# Patient Record
Sex: FEMALE | Race: WHITE | NOT HISPANIC OR LATINO | Employment: PART TIME | URBAN - METROPOLITAN AREA
[De-identification: names, ages, dates, MRNs, and addresses within clinical notes are randomized per-mention and may not be internally consistent; named-entity substitution may affect disease eponyms.]

---

## 2017-05-22 ENCOUNTER — ALLSCRIPTS OFFICE VISIT (OUTPATIENT)
Dept: OTHER | Facility: OTHER | Age: 22
End: 2017-05-22

## 2017-08-25 ENCOUNTER — ALLSCRIPTS OFFICE VISIT (OUTPATIENT)
Dept: OTHER | Facility: OTHER | Age: 22
End: 2017-08-25

## 2017-08-25 DIAGNOSIS — N92.6 IRREGULAR MENSTRUATION: ICD-10-CM

## 2017-10-17 ENCOUNTER — GENERIC CONVERSION - ENCOUNTER (OUTPATIENT)
Dept: OTHER | Facility: OTHER | Age: 22
End: 2017-10-17

## 2018-01-13 VITALS
HEIGHT: 64 IN | WEIGHT: 117.38 LBS | DIASTOLIC BLOOD PRESSURE: 74 MMHG | SYSTOLIC BLOOD PRESSURE: 100 MMHG | BODY MASS INDEX: 20.04 KG/M2

## 2018-01-14 VITALS
DIASTOLIC BLOOD PRESSURE: 74 MMHG | SYSTOLIC BLOOD PRESSURE: 118 MMHG | HEIGHT: 64 IN | BODY MASS INDEX: 21.53 KG/M2 | WEIGHT: 126.13 LBS

## 2018-01-22 VITALS
WEIGHT: 117 LBS | HEIGHT: 64 IN | SYSTOLIC BLOOD PRESSURE: 112 MMHG | DIASTOLIC BLOOD PRESSURE: 76 MMHG | BODY MASS INDEX: 19.97 KG/M2

## 2021-08-04 DIAGNOSIS — Z30.430 ENCOUNTER FOR IUD INSERTION: Primary | ICD-10-CM

## 2021-08-04 RX ORDER — MISOPROSTOL 200 UG/1
TABLET ORAL
Qty: 3 TABLET | Refills: 0 | Status: SHIPPED | OUTPATIENT
Start: 2021-08-04

## 2021-08-04 RX ORDER — MISOPROSTOL 200 UG/1
TABLET ORAL
Qty: 3 TABLET | Refills: 0 | Status: CANCELLED | OUTPATIENT
Start: 2021-08-04

## 2021-08-05 ENCOUNTER — TELEPHONE (OUTPATIENT)
Dept: OBGYN CLINIC | Facility: CLINIC | Age: 26
End: 2021-08-05

## 2021-08-05 ENCOUNTER — PROCEDURE VISIT (OUTPATIENT)
Dept: OBGYN CLINIC | Facility: CLINIC | Age: 26
End: 2021-08-05
Payer: COMMERCIAL

## 2021-08-05 VITALS
BODY MASS INDEX: 20.73 KG/M2 | WEIGHT: 121.4 LBS | HEIGHT: 64 IN | DIASTOLIC BLOOD PRESSURE: 68 MMHG | SYSTOLIC BLOOD PRESSURE: 94 MMHG

## 2021-08-05 DIAGNOSIS — Z30.433 ENCOUNTER FOR REMOVAL AND REINSERTION OF INTRAUTERINE CONTRACEPTIVE DEVICE (IUD): Primary | ICD-10-CM

## 2021-08-05 DIAGNOSIS — Z12.4 CERVICAL CANCER SCREENING: ICD-10-CM

## 2021-08-05 PROCEDURE — 58301 REMOVE INTRAUTERINE DEVICE: CPT | Performed by: OBSTETRICS & GYNECOLOGY

## 2021-08-05 PROCEDURE — 58300 INSERT INTRAUTERINE DEVICE: CPT | Performed by: OBSTETRICS & GYNECOLOGY

## 2021-08-05 PROCEDURE — G0145 SCR C/V CYTO,THINLAYER,RESCR: HCPCS | Performed by: OBSTETRICS & GYNECOLOGY

## 2021-08-05 NOTE — PROGRESS NOTES
Iud removal    Date/Time: 8/5/2021 8:22 AM  Performed by: Loly Latham MD  Authorized by: Loly Latham MD   Universal Protocol:  Consent: Verbal consent obtained  Consent given by: patient  Timeout called at: 8/5/2021 8:13 AM   Patient understanding: patient states understanding of the procedure being performed  Patient consent: the patient's understanding of the procedure matches consent given  Procedure consent: procedure consent matches procedure scheduled  Relevant documents: relevant documents present and verified  Test results: test results available and properly labeled  Site marked: the operative site was not marked  Radiology Images displayed and confirmed  If images not available, report reviewed: imaging studies not available  Patient identity confirmed: verbally with patient      Procedure:     Removed with no complications: yes      Removal due to mechanical complications of IUD: no      Removal due to infection and inflammatory reaction: no      Other reason for removal:  Expiration of the  Comments: The IUD was removed difficulty shown to the patient and then discarded  Iud insertions    Date/Time: 8/5/2021 8:24 AM  Performed by: Loly Latham MD  Authorized by: Loly Latham MD   Universal Protocol:  Consent: Verbal consent obtained  Consent given by: patient  Time out: Immediately prior to procedure a "time out" was called to verify the correct patient, procedure, equipment, support staff and site/side marked as required    Timeout called at: 8/5/2021 8:24 AM   Patient understanding: patient states understanding of the procedure being performed  Patient consent: the patient's understanding of the procedure matches consent given  Procedure consent: procedure consent matches procedure scheduled  Relevant documents: relevant documents present and verified  Test results: test results available and properly labeled  Site marked: the operative site was not marked  Radiology Images displayed and confirmed  If images not available, report reviewed: imaging studies not available  Patient identity confirmed: verbally with patient        Procedure:     Pelvic exam performed: yes      Negative GC/chlamydia test: yes      Cervix cleaned and prepped: yes      Speculum placed in vagina: yes      Tenaculum applied to cervix: yes      Uterus sounded: yes      Uterus sound depth (cm):  6    IUD inserted with no complications: yes      IUD type:  Mirena    Strings trimmed: yes    Post-procedure:     Patient tolerated procedure well: yes      Patient will follow up after next period: yes    Comments: The IUD was inserted for difficulty, transabdominal ultrasound showed proper placement without a evidence of perforation  The patient tolerated procedure well return to office in 5 weeks for IUD follow-up  Brochure was given

## 2021-08-11 LAB
LAB AP GYN PRIMARY INTERPRETATION: NORMAL
Lab: NORMAL

## 2021-09-08 ENCOUNTER — OFFICE VISIT (OUTPATIENT)
Dept: OBGYN CLINIC | Facility: CLINIC | Age: 26
End: 2021-09-08
Payer: COMMERCIAL

## 2021-09-08 VITALS
DIASTOLIC BLOOD PRESSURE: 76 MMHG | BODY MASS INDEX: 20.83 KG/M2 | HEIGHT: 64 IN | SYSTOLIC BLOOD PRESSURE: 120 MMHG | WEIGHT: 122 LBS

## 2021-09-08 DIAGNOSIS — Z30.431 IUD CHECK UP: Primary | ICD-10-CM

## 2021-09-08 PROCEDURE — 99213 OFFICE O/P EST LOW 20 MIN: CPT | Performed by: OBSTETRICS & GYNECOLOGY

## 2021-09-08 RX ORDER — LEVONORGESTREL 13.5 MG/1
INTRAUTERINE DEVICE INTRAUTERINE
COMMUNITY

## 2021-09-08 NOTE — PATIENT INSTRUCTIONS
The patient was informed of proper place for IUD without evidence of movement or expulsion or perforation  Return to office for yearly exam is scheduled

## 2021-09-08 NOTE — PROGRESS NOTES
This is a 33-year-old female now returns for IUD follow-up  The IUD was placed approximately 5 weeks ago  No bleeding problems  No problem with intimacy  She is happy with results  Of the continue  Transabdominal ultrasound showed proper placement of the IUD without evidence of perforation  Pelvic exam shows the IUD string coming out of the the os  There is no evidence of protrusion of the IUD itself  Impression patient doing well the IUD no side effects complaint return to the office for yearly exam as scheduled

## 2022-02-08 ENCOUNTER — NURSE TRIAGE (OUTPATIENT)
Dept: OTHER | Facility: OTHER | Age: 27
End: 2022-02-08

## 2022-02-08 NOTE — TELEPHONE ENCOUNTER
Regarding: right groin swelling  ----- [facility] RN sent at 2/8/2022  5:21 PM EST -----  "My daughter has swelling in right groin area, starting to spread"

## 2022-02-08 NOTE — TELEPHONE ENCOUNTER
Spoke with Dr Mayur Biggs, she advised patient call office tomorrow morning to see if she can be seen, in the meantime she can use an OTC yeast medication and/or  hydrocortisone ointment  Patient made aware and verbalized understanding  Advised to call back with questions or concerns  Reason for Disposition   [1] MILD-MODERATE pain AND [2] present > 24 hours (Exception: chronic pain)    Answer Assessment - Initial Assessment Questions  1  SYMPTOM: "What's the main symptom you're concerned about?" (e g , pain, itching, dryness)      Swelling of clitoris  2  LOCATION: "Where is the swelling located?" (e g , inside/outside, left/right)     Clitoris and right groin area  3  ONSET: "When did the swelling start?"      Right groin area swelling started on Sunday and clitoris swelling noticed this morning  Also noticed swelling near anus on right side  4  PAIN: "Is there any pain?" If Yes, ask: "How bad is it?" (Scale: 1-10; mild, moderate, severe)      Discomfort on clitoris area constantly, area from just above vagina to right groin tender to touch  5  ITCHING: "Is there any itching?" If Yes, ask: "How bad is it?" (Scale: 1-10; mild, moderate, severe)     Denies  6  CAUSE: "Have you had the same problem before? What happened then?"      No  7  OTHER SYMPTOMS: "Do you have any other symptoms?" (e g , fever, itching, vaginal bleeding, pain with urination, injury to genital area, vaginal foreign body)      Was DX with UTI on Sunday  Was prescribed cephalexin  8  PREGNANCY: "Is there any chance you are pregnant?" "When was your last menstrual period?"      No, IUD   LMP unsure on IUD    Protocols used: [facility]

## 2022-02-09 ENCOUNTER — OFFICE VISIT (OUTPATIENT)
Dept: OBGYN CLINIC | Facility: CLINIC | Age: 27
End: 2022-02-09
Payer: COMMERCIAL

## 2022-02-09 VITALS
BODY MASS INDEX: 20.11 KG/M2 | HEIGHT: 64 IN | DIASTOLIC BLOOD PRESSURE: 78 MMHG | SYSTOLIC BLOOD PRESSURE: 120 MMHG | WEIGHT: 117.8 LBS

## 2022-02-09 DIAGNOSIS — N76.0 ACUTE VAGINITIS: ICD-10-CM

## 2022-02-09 DIAGNOSIS — N76.0 VAGINAL INFECTION: Primary | ICD-10-CM

## 2022-02-09 PROCEDURE — 99214 OFFICE O/P EST MOD 30 MIN: CPT | Performed by: OBSTETRICS & GYNECOLOGY

## 2022-02-09 RX ORDER — AZITHROMYCIN 500 MG/1
TABLET, FILM COATED ORAL
Qty: 2 TABLET | Refills: 0 | Status: SHIPPED | OUTPATIENT
Start: 2022-02-09 | End: 2022-02-10

## 2022-02-09 RX ORDER — FLUCONAZOLE 200 MG/1
TABLET ORAL
Qty: 2 TABLET | Refills: 2 | Status: SHIPPED | OUTPATIENT
Start: 2022-02-09 | End: 2022-02-10

## 2022-02-09 NOTE — PROGRESS NOTES
This is a 82-year-old white female, she is nulliparous  Her current method of contraception includes the IUD which is working well  She set up a virtual visit on Sunday the 6 of February 2 for what she thought was urinary tract symptoms  She was given Pyridium and a prescription for Keflex 500 milligrams b i d   She only after that following day on Monday she noticed a tender spot in her right groin  She also states that her vagina was enlarged her labial that they were effective was swollen  She had no problems with urinating  There were no blisters  There was no fever or chill  She informed her mother last evening and she wants to be seen today  Examination of the abdomen is unremarkable  Pelvic region shows a tender lymph node in the right inguinal canal I slid lymph node  The external genitalia grossly normal in appearance there was no evidence of swelling or discoloration of the labia bilaterally  The vagina was clean there is no odor  The IUD string was seen  There is no cervical motion tenderness  The adnexa clear bilaterally  There is no evidence of pelvic inflammatory disease  The patient states the swelling has improved she is feeling somewhat better  Impression I believe the patient has some sort of vaginal infection the string to the lymph node  She is currently on cephalexin 5 milligrams b i d  I am going to add azithromycin 1 gram today  Cultures were taken for GC and chlamydia  The patient call me in 24 hours for progress report

## 2022-02-09 NOTE — PATIENT INSTRUCTIONS
The patient was informed of my impression should prior has an acute vaginal infection with a positive lymph node in the right groin  She has responded to the cephalosporin which she received earlier this week  We will add azithromycin to cover for chlamydia  She also given a prescription for Diflucan in case she has a yeast infection  She will call my office in 48 hours with a progress report  Vaginal cultures were taken  The remainder of pelvic exam was completely benign there is no evidence of PID  - start amp/gent  - order cbc w/ diff, blood culture  - type and screen

## 2022-02-10 ENCOUNTER — TELEPHONE (OUTPATIENT)
Dept: OBGYN CLINIC | Facility: CLINIC | Age: 27
End: 2022-02-10

## 2022-02-10 NOTE — TELEPHONE ENCOUNTER
I spoke with the patient this morning she feels much better  Lymph node is smaller  She did throw up after taking the Zithromax  She is not sure the medicine stay down  She will call me in 24 hours if the lymph node is getting worse will put her on azithromycin again  She will continue to eat a bland diet stay away from greasy foods today

## 2022-02-12 LAB
A VAGINAE DNA VAG NAA+PROBE-LOG#: NOT DETECTED LOG CELLS/ML
C GLABRATA DNA VAG QL NAA+PROBE: NOT DETECTED
C TRACH RRNA SPEC QL NAA+PROBE: NOT DETECTED
CANDIDA DNA VAG QL NAA+PROBE: NOT DETECTED
G VAGINALIS DNA VAG NAA+PROBE-LOG#: <4.7 LOG (CELLS/ML)
LACTOBACILLUS DNA VAG NAA+PROBE-LOG#: 7.1 LOG (CELLS/ML)
MEGASPHAERA SP DNA VAG NAA+PROBE-LOG#: NOT DETECTED LOG CELLS/ML
N GONORRHOEA RRNA SPEC QL NAA+PROBE: NOT DETECTED
SL AMB BV CATEGORY:: NORMAL
SL AMB C. PARAPSILOSIS, DNA: NOT DETECTED
SL AMB C. TROPICALIS, DNA: NOT DETECTED
T VAGINALIS RRNA SPEC QL NAA+PROBE: NOT DETECTED

## 2023-03-13 ENCOUNTER — ANNUAL EXAM (OUTPATIENT)
Dept: OBGYN CLINIC | Facility: CLINIC | Age: 28
End: 2023-03-13

## 2023-03-13 VITALS
SYSTOLIC BLOOD PRESSURE: 112 MMHG | WEIGHT: 126 LBS | HEIGHT: 64 IN | BODY MASS INDEX: 21.51 KG/M2 | DIASTOLIC BLOOD PRESSURE: 62 MMHG

## 2023-03-13 DIAGNOSIS — Z01.419 WOMEN'S ANNUAL ROUTINE GYNECOLOGICAL EXAMINATION: Primary | ICD-10-CM

## 2023-03-13 DIAGNOSIS — R10.31 RIGHT LOWER QUADRANT PAIN: ICD-10-CM

## 2023-03-13 DIAGNOSIS — Z12.4 SCREENING FOR MALIGNANT NEOPLASM OF THE CERVIX: ICD-10-CM

## 2023-03-13 NOTE — PATIENT INSTRUCTIONS
The patient was informed of a stable GYN examination  She is experiencing some intermittent right lower quadrant pain  We will arrange for transvaginal ultrasound to check on right ovary  Her IUD string was seen  She will check to verify that we have the right IUD listed in her chart  She will be informed the results of her ultrasound when it returns  We will do that accordingly

## 2023-03-13 NOTE — PROGRESS NOTES
Assessment/Plan:    Patient was informed of a stable GYN examination except for discomfort in the right adnexa  Possible right ovarian cyst   Make arranges for an ultrasound the transvaginal probe  She was reminded to inform us of the IUD she has  We are not sure whether she has a 1year-old 5-year 1  Her IUD is good to the year 2024 if assigned 3-year  She is content with her weight  She feels safe at home  She declined STD screening  Denies any prior depression or anxiety  She will be informed results of the ultrasound when it returns  Subjective:      Patient ID: Dominick Harding is a 24 y o  female  HPI    This is a 68-year-old white female, she is nulliparous  She is in a stable relationship for over 5 years  Her current method of contraception includes the IUD  This should probably be replaced in the year 2024  She feels safe at home  She sees a dentist on a regular basis  She is content with her weight  Denies any serious problem with depression  No major  or GI complaint  Is no problem with intimacy  She is happy with her weight  Planning a right lower quadrant pain that comes and goes  The following portions of the patient's history were reviewed and updated as appropriate: allergies, past family history, past medical history, past social history, past surgical history and problem list     Review of Systems   All other systems reviewed and are negative  Objective:      /62 (BP Location: Left arm, Patient Position: Sitting, Cuff Size: Adult)   Ht 5' 4" (1 626 m)   Wt 57 2 kg (126 lb)   BMI 21 63 kg/m²          Physical Exam  Vitals reviewed  Exam conducted with a chaperone present  Constitutional:       Appearance: Normal appearance  She is normal weight  HENT:      Head: Normocephalic and atraumatic        Nose: Nose normal       Mouth/Throat:      Mouth: Mucous membranes are moist    Eyes:      Conjunctiva/sclera: Conjunctivae normal       Pupils: Pupils are equal, round, and reactive to light  Cardiovascular:      Rate and Rhythm: Normal rate and regular rhythm  Pulses: Normal pulses  Heart sounds: Normal heart sounds  Pulmonary:      Effort: Pulmonary effort is normal       Breath sounds: Normal breath sounds  Chest:   Breasts:     Right: Normal       Left: Normal    Abdominal:      General: Abdomen is flat  Bowel sounds are normal       Palpations: Abdomen is soft  There is no hepatomegaly or splenomegaly  Hernia: No hernia is present  There is no hernia in the left inguinal area or right inguinal area  Comments: Right lower quadrant pain with deep palpation  No hernia  Genitourinary:     General: Normal vulva  Pubic Area: No rash or pubic lice  Labia:         Right: No rash, tenderness, lesion or injury  Left: No rash, tenderness or lesion  Urethra: No prolapse, urethral pain, urethral swelling or urethral lesion  Vagina: Normal       Cervix: Normal       Uterus: Normal  Not deviated, not enlarged, not fixed, not tender and no uterine prolapse  Adnexa: Left adnexa normal         Right: Tenderness present  Left: No mass, tenderness or fullness  Rectum: Normal       Comments: The external genitalia normal limits the vagina is clean cervix is closed the IUD string was seen  A Pap smear was performed  The left adnexa is clear  Questionable tenderness on the right adnexal, possible right ovarian cyst we will make arrangements for an ultrasound  There is no evidence of prolapse  The bladder and urethra are normal relationship  Musculoskeletal:         General: Normal range of motion  Cervical back: Normal range of motion and neck supple  Lymphadenopathy:      Lower Body: No right inguinal adenopathy  No left inguinal adenopathy  Skin:     General: Skin is warm and dry  Neurological:      General: No focal deficit present        Mental Status: She is alert and oriented to person, place, and time  Psychiatric:         Mood and Affect: Mood normal          Behavior: Behavior normal          Thought Content:  Thought content normal

## 2023-03-17 LAB
LAB AP GYN PRIMARY INTERPRETATION: NORMAL
Lab: NORMAL

## 2023-05-17 ENCOUNTER — TELEPHONE (OUTPATIENT)
Dept: OBGYN CLINIC | Facility: CLINIC | Age: 28
End: 2023-05-17

## 2023-05-17 DIAGNOSIS — R10.2 PELVIC PAIN: Primary | ICD-10-CM

## 2023-05-17 NOTE — TELEPHONE ENCOUNTER
Rad order for pelvic U/S placed in pt's chart  Pt will schedule appt time for same [facility]  States she is still having pain

## 2023-05-17 NOTE — TELEPHONE ENCOUNTER
Pt was seen for annual exam 3/13/2023  She called to let us know the IUD she has in place is the Mirena  It was inserted in 2021 and due to come out 08/2026  She was also instructed to have transvaginal ultrasound to check right ovary  She will need order placed

## 2024-04-15 ENCOUNTER — ANNUAL EXAM (OUTPATIENT)
Dept: OBGYN CLINIC | Facility: CLINIC | Age: 29
End: 2024-04-15
Payer: COMMERCIAL

## 2024-04-15 VITALS
HEIGHT: 64 IN | SYSTOLIC BLOOD PRESSURE: 112 MMHG | BODY MASS INDEX: 21.75 KG/M2 | WEIGHT: 127.4 LBS | DIASTOLIC BLOOD PRESSURE: 66 MMHG

## 2024-04-15 DIAGNOSIS — Z01.419 WOMEN'S ANNUAL ROUTINE GYNECOLOGICAL EXAMINATION: Primary | ICD-10-CM

## 2024-04-15 PROCEDURE — 99395 PREV VISIT EST AGE 18-39: CPT | Performed by: OBSTETRICS & GYNECOLOGY

## 2024-04-15 NOTE — PATIENT INSTRUCTIONS
The patient was informed of a stable GYN examination.  A Pap smear was performed.  She is content with her weight.  She denies any major  or GI complaint.  The IUD string was trimmed short as per her request.  She is aware the IUD is removed in a year August 2029.  She should return to my office in 1 year unless new issues occur.

## 2024-04-15 NOTE — PROGRESS NOTES
"Assessment/Plan:    The patient was informed of a stable GYN examination.  A Pap smear was performed.  She is in a stable relationship.  She declined STD screening.  The IUD string was trimmed short as per her request.  She should return to my office in 1 year.  There are no new major family illnesses to report.  There is no major  or GI complaint.  She is content with her weight.  She should return to my office in 1 year unless new issues or concerns arise.         Subjective:      Patient ID: Pedro Lucia is a 28 y.o. female.    HPI    This is a 28-year-old female, she is nulliparous.  Her current method of contraception includes the Mirena IUD.  This was placed in August 2021.  This should be removed in August 2028.  She says her partner is complaining about the string.  We might try to cut the string shorter today.  She is otherwise happy with this method of contraception.  She is content with her weight.  She feels safe at home.  She is reminded to see a dentist on a regular basis.  Denies any prior depression or anxiety.  Denies any major  or GI complaint.  There are no new major family illnesses to report.  She will need a Pap smear today.  She declined STD screening.        The following portions of the patient's history were reviewed and updated as appropriate: allergies, current medications, past family history, past medical history, past social history, past surgical history, and problem list.    Review of Systems   All other systems reviewed and are negative.        Objective:      /66 (BP Location: Left arm, Patient Position: Sitting)   Ht 5' 4.25\" (1.632 m)   Wt 57.8 kg (127 lb 6.4 oz)   BMI 21.70 kg/m²          Physical Exam  Vitals reviewed. Exam conducted with a chaperone present.   Constitutional:       Appearance: Normal appearance.   HENT:      Head: Normocephalic and atraumatic.      Nose: Nose normal.      Mouth/Throat:      Mouth: Mucous membranes are moist.   Eyes:      " Extraocular Movements: Extraocular movements intact.      Pupils: Pupils are equal, round, and reactive to light.   Cardiovascular:      Rate and Rhythm: Normal rate and regular rhythm.      Pulses: Normal pulses.      Heart sounds: Normal heart sounds.   Pulmonary:      Effort: Pulmonary effort is normal.      Breath sounds: Normal breath sounds.   Chest:   Breasts:     Breasts are symmetrical.      Right: Normal.      Left: Normal.   Abdominal:      General: Abdomen is flat. Bowel sounds are normal. There is no distension.      Palpations: Abdomen is soft. There is no hepatomegaly, splenomegaly or mass.      Hernia: No hernia is present. There is no hernia in the left inguinal area or right inguinal area.   Genitourinary:     General: Normal vulva.      Pubic Area: No rash or pubic lice.       Labia:         Right: No rash, tenderness or lesion.         Left: No rash, tenderness or lesion.       Urethra: No prolapse, urethral pain, urethral swelling or urethral lesion.      Vagina: No signs of injury and foreign body. No vaginal discharge, erythema, tenderness, bleeding, lesions or prolapsed vaginal walls.      Cervix: Normal.      Uterus: Normal.       Adnexa: Right adnexa normal and left adnexa normal.      Rectum: Normal.      Comments: The external genitalia normal limits the vagina is clean.  Uterus is anterior normal size.  There is no cervical motion tenderness.  The IUD string was seen.  The string was cut short at per patient's request.  A Pap smear was also performed.  The adnexa is clear bilaterally.  There is no evidence of prolapse.  She is aware the IUD is removed in the year August 2029.  Musculoskeletal:         General: Normal range of motion.      Cervical back: Normal range of motion and neck supple.   Lymphadenopathy:      Upper Body:      Right upper body: No supraclavicular adenopathy.      Left upper body: No supraclavicular adenopathy.   Skin:     General: Skin is warm and dry.    Neurological:      General: No focal deficit present.      Mental Status: She is alert and oriented to person, place, and time.   Psychiatric:         Mood and Affect: Mood normal.         Behavior: Behavior normal.

## 2024-04-17 LAB
CLINICAL INFO: NORMAL
CYTO CVX: NORMAL
CYTOLOGY CMNT CVX/VAG CYTO-IMP: NORMAL
DATE PREVIOUS BX: NORMAL
LMP START DATE: NORMAL
SL AMB PREV. PAP:: NORMAL
SPECIMEN SOURCE CVX/VAG CYTO: NORMAL